# Patient Record
Sex: FEMALE | Race: WHITE | Employment: FULL TIME | URBAN - NONMETROPOLITAN AREA
[De-identification: names, ages, dates, MRNs, and addresses within clinical notes are randomized per-mention and may not be internally consistent; named-entity substitution may affect disease eponyms.]

---

## 2017-09-08 ENCOUNTER — HOSPITAL ENCOUNTER (OUTPATIENT)
Dept: PHYSICAL THERAPY | Age: 57
Setting detail: THERAPIES SERIES
Discharge: HOME | End: 2017-09-08
Payer: COMMERCIAL

## 2017-09-08 PROCEDURE — 97162 PT EVAL MOD COMPLEX 30 MIN: CPT

## 2017-09-10 ASSESSMENT — PAIN DESCRIPTION - LOCATION: LOCATION: LEG

## 2017-09-10 ASSESSMENT — PAIN DESCRIPTION - PAIN TYPE: TYPE: CHRONIC PAIN

## 2017-09-10 ASSESSMENT — PAIN DESCRIPTION - DESCRIPTORS: DESCRIPTORS: CONSTANT;TENDER

## 2017-09-10 ASSESSMENT — PAIN DESCRIPTION - ORIENTATION: ORIENTATION: LEFT;RIGHT

## 2017-09-10 ASSESSMENT — PAIN SCALES - GENERAL: PAINLEVEL_OUTOF10: 6

## 2017-09-18 ENCOUNTER — HOSPITAL ENCOUNTER (OUTPATIENT)
Dept: PHYSICAL THERAPY | Age: 57
Setting detail: THERAPIES SERIES
Discharge: HOME | End: 2017-09-18
Payer: COMMERCIAL

## 2017-09-18 PROCEDURE — 97140 MANUAL THERAPY 1/> REGIONS: CPT

## 2017-09-18 ASSESSMENT — PAIN SCALES - GENERAL: PAINLEVEL_OUTOF10: 7

## 2017-09-18 ASSESSMENT — PAIN DESCRIPTION - PAIN TYPE: TYPE: CHRONIC PAIN

## 2017-09-18 ASSESSMENT — PAIN DESCRIPTION - LOCATION: LOCATION: LEG

## 2017-09-18 ASSESSMENT — PAIN DESCRIPTION - DESCRIPTORS: DESCRIPTORS: TENDER;THROBBING;ACHING

## 2017-09-18 ASSESSMENT — PAIN DESCRIPTION - ORIENTATION: ORIENTATION: RIGHT

## 2017-09-20 ENCOUNTER — HOSPITAL ENCOUNTER (OUTPATIENT)
Dept: PHYSICAL THERAPY | Age: 57
Setting detail: THERAPIES SERIES
Discharge: HOME | End: 2017-09-20
Payer: COMMERCIAL

## 2017-09-20 PROCEDURE — 97140 MANUAL THERAPY 1/> REGIONS: CPT

## 2017-09-20 ASSESSMENT — PAIN DESCRIPTION - DESCRIPTORS: DESCRIPTORS: ACHING;SORE;TENDER

## 2017-09-20 ASSESSMENT — PAIN DESCRIPTION - PAIN TYPE: TYPE: CHRONIC PAIN

## 2017-09-20 ASSESSMENT — PAIN DESCRIPTION - LOCATION: LOCATION: LEG

## 2017-09-20 ASSESSMENT — PAIN SCALES - GENERAL: PAINLEVEL_OUTOF10: 3

## 2017-09-20 ASSESSMENT — PAIN DESCRIPTION - ORIENTATION: ORIENTATION: RIGHT

## 2017-09-22 ENCOUNTER — HOSPITAL ENCOUNTER (OUTPATIENT)
Dept: PHYSICAL THERAPY | Age: 57
Setting detail: THERAPIES SERIES
Discharge: HOME | End: 2017-09-22
Payer: COMMERCIAL

## 2017-09-22 PROCEDURE — 97140 MANUAL THERAPY 1/> REGIONS: CPT

## 2017-09-22 ASSESSMENT — PAIN DESCRIPTION - PAIN TYPE: TYPE: CHRONIC PAIN

## 2017-09-22 ASSESSMENT — PAIN DESCRIPTION - ORIENTATION: ORIENTATION: RIGHT

## 2017-09-22 ASSESSMENT — PAIN DESCRIPTION - LOCATION: LOCATION: LEG;KNEE

## 2017-09-22 ASSESSMENT — PAIN DESCRIPTION - DESCRIPTORS: DESCRIPTORS: ACHING;TENDER;SHOOTING

## 2017-09-22 ASSESSMENT — PAIN SCALES - GENERAL: PAINLEVEL_OUTOF10: 6

## 2017-09-25 ENCOUNTER — HOSPITAL ENCOUNTER (OUTPATIENT)
Dept: PHYSICAL THERAPY | Age: 57
Setting detail: THERAPIES SERIES
Discharge: HOME | End: 2017-09-25
Payer: COMMERCIAL

## 2017-09-25 PROCEDURE — 97140 MANUAL THERAPY 1/> REGIONS: CPT

## 2017-09-25 ASSESSMENT — PAIN DESCRIPTION - PAIN TYPE: TYPE: CHRONIC PAIN

## 2017-09-25 ASSESSMENT — PAIN SCALES - GENERAL: PAINLEVEL_OUTOF10: 3

## 2017-09-25 ASSESSMENT — PAIN DESCRIPTION - LOCATION: LOCATION: LEG;KNEE

## 2017-09-25 ASSESSMENT — PAIN DESCRIPTION - DESCRIPTORS: DESCRIPTORS: ACHING;TENDER

## 2017-09-25 ASSESSMENT — PAIN DESCRIPTION - ORIENTATION: ORIENTATION: RIGHT;LEFT

## 2017-09-27 ENCOUNTER — HOSPITAL ENCOUNTER (OUTPATIENT)
Dept: PHYSICAL THERAPY | Age: 57
Setting detail: THERAPIES SERIES
Discharge: HOME | End: 2017-09-27
Payer: COMMERCIAL

## 2017-09-27 PROCEDURE — 97140 MANUAL THERAPY 1/> REGIONS: CPT

## 2017-09-27 ASSESSMENT — PAIN DESCRIPTION - PAIN TYPE: TYPE: CHRONIC PAIN

## 2017-09-27 ASSESSMENT — PAIN DESCRIPTION - DESCRIPTORS: DESCRIPTORS: ACHING;THROBBING

## 2017-09-27 ASSESSMENT — PAIN DESCRIPTION - LOCATION: LOCATION: KNEE;HIP;ANKLE

## 2017-09-27 ASSESSMENT — PAIN SCALES - GENERAL: PAINLEVEL_OUTOF10: 5

## 2017-09-27 ASSESSMENT — PAIN DESCRIPTION - ORIENTATION: ORIENTATION: RIGHT

## 2017-09-29 ENCOUNTER — HOSPITAL ENCOUNTER (OUTPATIENT)
Dept: PHYSICAL THERAPY | Age: 57
Setting detail: THERAPIES SERIES
Discharge: HOME | End: 2017-09-29
Payer: COMMERCIAL

## 2017-09-29 PROCEDURE — 97140 MANUAL THERAPY 1/> REGIONS: CPT

## 2017-09-29 ASSESSMENT — PAIN SCALES - GENERAL: PAINLEVEL_OUTOF10: 4

## 2017-09-29 ASSESSMENT — PAIN DESCRIPTION - ORIENTATION: ORIENTATION: RIGHT

## 2017-09-29 ASSESSMENT — PAIN DESCRIPTION - PAIN TYPE: TYPE: CHRONIC PAIN

## 2017-09-29 ASSESSMENT — PAIN DESCRIPTION - DESCRIPTORS: DESCRIPTORS: ACHING;THROBBING

## 2017-09-29 ASSESSMENT — PAIN DESCRIPTION - LOCATION: LOCATION: ANKLE;KNEE

## 2017-10-02 ENCOUNTER — HOSPITAL ENCOUNTER (OUTPATIENT)
Dept: PHYSICAL THERAPY | Age: 57
Setting detail: THERAPIES SERIES
Discharge: HOME | End: 2017-10-02
Payer: COMMERCIAL

## 2017-10-02 PROCEDURE — 97140 MANUAL THERAPY 1/> REGIONS: CPT

## 2017-10-02 ASSESSMENT — PAIN SCALES - GENERAL: PAINLEVEL_OUTOF10: 2

## 2017-10-02 ASSESSMENT — PAIN DESCRIPTION - ORIENTATION: ORIENTATION: RIGHT

## 2017-10-02 ASSESSMENT — PAIN DESCRIPTION - PAIN TYPE: TYPE: CHRONIC PAIN

## 2017-10-02 ASSESSMENT — PAIN DESCRIPTION - DESCRIPTORS: DESCRIPTORS: ACHING

## 2017-10-02 NOTE — PROGRESS NOTES
Freda [facility] 61  LYMPHEDEMA SERVICES  DAILY NOTE    Date: 10/2/2017   Patient Name: Haritha Valle        CSN: [id number]   : 1960  (55 y.o.)  Gender: female   Referring Practitioner: DR. Derek Blank. Emily Trent DPM  Diagnosis: EDEMA/LYMPHEDEMA BILATERAL LEGS  Referral Date : 17    PT Visit Information  PT Insurance Information: CH[employer]L CERRATO/BS  Total # of Visits Approved: 20 (HARD LIMIT)  Total # of Visits to Date: 8  Plan of Care/Certification Expiration Date: 17  No Show: 0  Canceled Appointment: 0  Progress Note Counter: 8/10    Restrictions/Precautions  Fall risk, Universal precautions,History of right TKR (). Pain  Patient Currently in Pain: Yes (The patient reported that she accidently caught her foot on something at work and twisted her right lower extremity, especially the right knee region.)  Pain Assessment  Pain Assessment: 0-10  Pain Level: 2  Pain Type: Chronic pain  Pain Location:  (Ankle to knee )  Pain Orientation: Right  Pain Descriptors: Aching (\"It's nothing like it was though\" per patient.)    SUBJECTIVE     -10/02/2017: The patient presented to the Lymphedema clinic on this with Tubigrip stockinette (double layer) on the right lower extremity. The patient stated that she didn't do a lot of walking yesterday because she had a migraine. \"I can tell the difference if I don't walk enough, but I just couldn't do anything but lay in the dark room\" per patient.   -2017: The patient presented to the Lymphedema clinic on this date with no new complaints. \"My legs feel a little bit better today but then I didn't have to stand on the floor without a mat yesterday. \" per patient.   -2017: The patient presented to the Lymphedema clinic with her old compression stockings on (bilateral knee high). She reported that she started having increased discomfort in the plantar surface of the right heel and it made it difficult for her to ambulate due to the pain. -09/25/2017: The patient presented to the Lymphedema clinic on this date with compression bandages off of the right lower extremity due to getting the bandages wet while showering on Saturday morning. She reported that she wrapped the leg with bandages up and thought she had the wrap on tight but notice standing water in the bag at the end of her shower. Therefore the bandages were removed, which was proper compression bandaging precautions to decrease risk of skin breakdown. The patient stated, \"I was afraid not to have anything on my legs, so I pulled out my old compression stockings and I was amazed that I could get them on. \" per patient. The patient wore the old knee high compression stockings in today, she denied pain at with the compression stockings on. OBJECTIVE  -MEASUREMENTS  -LOCATION (A): Metatarsal heads to knee joint line (Right)  -416.2 cm (Increased by 1.9 cm in comparison to last measurements taken on 9/25/2017). -LOCATION (B): Knee joint line to proximal thigh (Right).  -318.2 cm (Decreased by 5.3 cm in comparison to last measurements taken on 9/25/2017).     TREATMENT SESSION  Manual Lymph Drainage  Unilateral Lower Extremity Manual Lymph Drainage (MLD):   Right  Trunk:  Effleurage, Profundus, Terminus, Inguinal Lnn, Axillary Lnn and (IA) Anterior Inguinal to Axillary (3 steps)       Lower Extremity:  Thigh (Anterior, Posterior, Lateral, Medial to Lateral), Knee (Anterior, Posterior, Lateral, Medial, Lower Leg (Anterior,Posterior), Ankle (Anterior, Posterior, Lateral, Medial and Foot/Toes (Anterior, Posterior)       Rework  Lower Extremity (Toes to Groin), Inguinal Lnn, Axillary Lnn, (IA) Anterior Inguinal to Axillary, Terminus, Profundus and Effleurage     Manual Lymph Drainage  Unilateral Lower Extremity Manual Lymph Drainage (MLD):   Right  Trunk:  Effleurage, Profundus, Terminus, Inguinal Lnn, Axillary Lnn and (IA) Anterior Inguinal to Axillary (3 steps)       Lower Extremity:  Thigh

## 2017-10-06 ENCOUNTER — HOSPITAL ENCOUNTER (OUTPATIENT)
Dept: PHYSICAL THERAPY | Age: 57
Setting detail: THERAPIES SERIES
End: 2017-10-06
Payer: COMMERCIAL

## 2017-10-09 ENCOUNTER — HOSPITAL ENCOUNTER (OUTPATIENT)
Dept: PHYSICAL THERAPY | Age: 57
Setting detail: THERAPIES SERIES
Discharge: HOME | End: 2017-10-09
Payer: COMMERCIAL

## 2017-10-09 PROCEDURE — 97140 MANUAL THERAPY 1/> REGIONS: CPT

## 2017-10-09 ASSESSMENT — PAIN DESCRIPTION - ORIENTATION: ORIENTATION: RIGHT;LEFT

## 2017-10-09 ASSESSMENT — PAIN DESCRIPTION - PAIN TYPE: TYPE: CHRONIC PAIN

## 2017-10-09 ASSESSMENT — PAIN SCALES - GENERAL: PAINLEVEL_OUTOF10: 3

## 2017-10-09 ASSESSMENT — PAIN DESCRIPTION - DESCRIPTORS: DESCRIPTORS: ACHING;TIGHTNESS

## 2017-10-09 ASSESSMENT — PAIN DESCRIPTION - LOCATION: LOCATION: LEG

## 2017-10-09 NOTE — PROGRESS NOTES
without a mat yesterday. \" per patient. OBJECTIVE  -Hyperplasia-Light moderate noted in the right lower extremity with mild in the left lower extremity.    -Temperature: Normal with the exception of increased mild warmth noted in the right knee region.  -Skin: Mild dry skin noted in the right lower extremity. TREATMENT SESSION  Manual Lymph Drainage  Bilateral Lower Extremity Manual Lymph Drainage (MLD):     Trunk:   Effleurage, Profundus, Terminus, Axillary Lnn and (IA) Anterior Inguinal to Axillary (3 steps)       Lower Extremity:  Thigh (Anterior, Posterior, Lateral, Medial to Lateral), Knee (Anterior, Posterior, Lateral, Medial, Lower Leg (Anterior,Posterior), Ankle (Anterior, Posterior, Lateral, Medial and Foot/Toes (Anterior, Posterior)       Rework  Lower Extremity (Toes to Groin), Axillary Lnn, (IA) Anterior Inguinal to Axillary, Terminus, Profundus and Effleurage    Fibrotic Technique  -PRESSURE: Moderate  -LOCATION: Posterior aspect of calf (right LE) and medial aspect of the left ankle region. Skin Care Measures  Washed involved extremity/body part and applied Eucerin moisturizing creme to moisturize skin    Compression Bandaging   Location: Right lower extremity (Metatarsal heads to knee joint line). Compression Gradient: Moderate to Minimal  Supplies (per involved extremity):   Stockinette: Size: 4 inches, Thickness: Thick, soft. Transelast none   10 cm Artiflex Cotton none   15 cm Artiflex Cotton none   4 cm Rosidal K none   6 cm Rosidal K 1 roll   8 cm Rosidal K 1 roll   10 cm Rosidal K 1 roll   12 cm Rosidal K none   Rosidal Soft 1 roll    Tubigrip   -Size E from metatarsal heads to above ankle (right). NOTE: Donned the old compression knee high stocking on the patient's left lower extremity. Decongestive/Therapeutic Exercise  The patient was able to complete Decongestive Therapy exercises (x 10 reps) including ankle pumps.  The exercises were completed with compression bandages on, precautionary measures to decrease dry skin and risk of infection. X Patient/family/caregiver will demonstrate applying compression bandages with no greater than moderate assist and verbal cues from the therapist working towards being modified independent with applying the compression bandages for night time swelling. ASSESSMENT:  Assessment:  Patient progressing toward goal achievement. The patient would benefit from sequential compression pump for home use at discharge with lower trunk attachment due to increased proximal swelling leading into the lower abdominal region. The patient is making good progress with the Complete Decongestive Therapy/Manual Lymph Drainage (CDT/MLD) treatments. However, in spite of the progress being made in clinic, the patient continues to demonstrate increased swelling. Activity Tolerance:  Patient tolerance of  treatment: Fair(+).   Plan: Week of October 9 (2x)         Time In: 1200   Time Out: 1310   Timed Code Minutes: 70   Untimed Code Minutes: 0   Total Treatment Time: [address] P[address]TJeaneth #[id number], KATHRYN   10/9/2017

## 2017-10-13 ENCOUNTER — HOSPITAL ENCOUNTER (OUTPATIENT)
Dept: PHYSICAL THERAPY | Age: 57
Setting detail: THERAPIES SERIES
Discharge: HOME | End: 2017-10-13
Payer: COMMERCIAL

## 2017-10-13 PROCEDURE — 97140 MANUAL THERAPY 1/> REGIONS: CPT

## 2017-10-13 ASSESSMENT — PAIN DESCRIPTION - PAIN TYPE: TYPE: CHRONIC PAIN

## 2017-10-13 ASSESSMENT — PAIN DESCRIPTION - DESCRIPTORS: DESCRIPTORS: ACHING;THROBBING;SORE

## 2017-10-13 ASSESSMENT — PAIN SCALES - GENERAL: PAINLEVEL_OUTOF10: 7

## 2017-10-13 ASSESSMENT — PAIN DESCRIPTION - LOCATION: LOCATION: BACK;FOOT;LEG

## 2017-10-13 ASSESSMENT — PAIN DESCRIPTION - ORIENTATION: ORIENTATION: LEFT;RIGHT

## 2017-10-13 NOTE — PROGRESS NOTES
including ankle pumps. The exercises were completed with compression bandages on, without complaints of pain from the patient. The Decongestive Therapy exercises assist with decreasing the swelling by increasing the muscle pumping action of the lymph collectors and by increasing the fluid uptake in the lymphatic system. Patient Education  Education Provided:   -10/13/2017: Reviewed goals and plan of care. -10/09/2017: Reviewed goals and plan of care.  present to observe treatment, unable to complete hands on training due to recently right upper extremity injury. -10/02/2017: The patient's daughter presented to the clinic with the patient to observe today's treatment session.  -09/29/2017: Ongoing education with patient.   -09/27/2017: Discussed plan of care with plan for compression pump demo scheduled for 09/28/2017.  -09/25/2017: Discussed plan of care.   -09/22/2017: Reviewed plan of care.  -09/20/2017: Educated the patient on proper compression bandaging precautions; reviewed exercises including hourly ankle pumps and toe curls/spreading; and discussed safety awareness especially during ambulation.   -09/18/2017: Refer to exercises above. Reviewed goals/plan of care  Learner:patient and significant other  Method: demonstration, explanation and handout       Outcome: acknowledged understanding of goals/plan of care, demonstrated understanding and asked questions     HANDOUTS PROVIDED:  -09/20/2017: Compression bandaging precautions. GOALS   SHORT-TERM GOALS:  to be met in 6 weeks   X  PARTIALLY MET  (ONGOING)  Patient will demonstrate a decrease in circumferential measurements of the affected extremity by 10.0 cm working towards the lymphedema swelling stabilizing and patient being able to get measured and fitted for a new compression garment to be worn daily to keep swelling down.    X  NOT MET  Patient will tolerate wearing the compression bandages from one treatment session until the next

## 2017-10-20 ENCOUNTER — APPOINTMENT (OUTPATIENT)
Dept: PHYSICAL THERAPY | Age: 57
End: 2017-10-20
Payer: COMMERCIAL

## 2017-10-23 ENCOUNTER — APPOINTMENT (OUTPATIENT)
Dept: PHYSICAL THERAPY | Age: 57
End: 2017-10-23
Payer: COMMERCIAL

## 2017-10-27 ENCOUNTER — HOSPITAL ENCOUNTER (OUTPATIENT)
Dept: PHYSICAL THERAPY | Age: 57
Setting detail: THERAPIES SERIES
Discharge: HOME | End: 2017-10-27
Payer: COMMERCIAL

## 2017-10-27 PROCEDURE — 97140 MANUAL THERAPY 1/> REGIONS: CPT

## 2017-10-27 ASSESSMENT — PAIN DESCRIPTION - LOCATION: LOCATION: LEG

## 2017-10-27 ASSESSMENT — PAIN DESCRIPTION - PAIN TYPE: TYPE: CHRONIC PAIN

## 2017-10-27 ASSESSMENT — PAIN DESCRIPTION - DESCRIPTORS: DESCRIPTORS: THROBBING;STABBING;ACHING

## 2017-10-27 ASSESSMENT — PAIN SCALES - GENERAL: PAINLEVEL_OUTOF10: 2

## 2017-10-27 ASSESSMENT — PAIN DESCRIPTION - ORIENTATION: ORIENTATION: LEFT;RIGHT;LOWER

## 2017-10-27 NOTE — PROGRESS NOTES
treatment but he was unable to assist due (patient's  continues to be on restrictions due to Bicep tear). Decongestive/Therapeutic Exercise  The patient was able to complete Decongestive Therapy exercises (x 10 reps) including ankle pumps. The exercises were completed with compression bandages on, without complaints of pain from the patient. The Decongestive Therapy exercises assist with decreasing the swelling by increasing the muscle pumping action of the lymph collectors and by increasing the fluid uptake in the lymphatic system. Patient Education  Education Provided:   -10/27/2017: Refer to \"Compression Garment Assessment and Fitting\" above. Also discussed plan of care with working towards Phase II (training on proper self-MLD). Also provided the patient with new Tubigrip stockinette with instructions to wear the compression stockings daily and Tubigrip stockinette nightly. Patient was in agreement.   -10/13/2017: Reviewed goals and plan of care. -10/09/2017: Reviewed goals and plan of care.  present to observe treatment, unable to complete hands on training due to recently right upper extremity injury. -10/02/2017: The patient's daughter presented to the clinic with the patient to observe today's treatment session.  -09/29/2017: Ongoing education with patient.   -09/27/2017: Discussed plan of care with plan for compression pump demo scheduled for 09/28/2017.  -09/25/2017: Discussed plan of care.   -09/22/2017: Reviewed plan of care.  -09/20/2017: Educated the patient on proper compression bandaging precautions; reviewed exercises including hourly ankle pumps and toe curls/spreading; and discussed safety awareness especially during ambulation.   -09/18/2017: Refer to exercises above.  Reviewed goals/plan of care  Learner:patient and significant other  Method: demonstration, explanation and handout       Outcome: acknowledged understanding of goals/plan of care, demonstrated understanding of garment(s). ASSESSMENT:  Assessment:  Patient progressing toward goal achievement. The patient is making good progress with treatments however, continue to notice that the patient has difficulty keeping the swelling down with in bilateral lower extremities at home and has noticed an increase in swelling proximally in bilateral lower extremities and in lower truncal region. She would benefit from a Flexitouch sequential compression pump to become modified independent with home management skills prior to being able to be discharged from program.   Activity Tolerance:  Patient tolerance of  treatment: Fair (+). Plan: Week of November 6 (1x)-Working on Phase II skills.           Time In: 3430   Time Out: 0905   Timed Code Minutes: 70   Untimed Code Minutes: 0   Total Treatment Time: [address] PJeanethTJeaneth #[id number], Michelle Nguyễn [id number]   10/27/2017

## 2017-11-10 ENCOUNTER — HOSPITAL ENCOUNTER (OUTPATIENT)
Dept: PHYSICAL THERAPY | Age: 57
Setting detail: THERAPIES SERIES
End: 2017-11-10
Payer: COMMERCIAL

## 2017-11-15 ENCOUNTER — HOSPITAL ENCOUNTER (OUTPATIENT)
Dept: PHYSICAL THERAPY | Age: 57
Setting detail: THERAPIES SERIES
Discharge: HOME | End: 2017-11-15
Payer: COMMERCIAL

## 2017-11-15 PROCEDURE — 97140 MANUAL THERAPY 1/> REGIONS: CPT

## 2017-11-15 ASSESSMENT — PAIN DESCRIPTION - LOCATION: LOCATION: ANKLE

## 2017-11-15 ASSESSMENT — PAIN DESCRIPTION - DESCRIPTORS: DESCRIPTORS: ACHING

## 2017-11-15 ASSESSMENT — PAIN DESCRIPTION - ORIENTATION: ORIENTATION: LEFT;RIGHT

## 2017-11-15 ASSESSMENT — PAIN DESCRIPTION - PAIN TYPE: TYPE: CHRONIC PAIN

## 2017-11-15 ASSESSMENT — PAIN SCALES - GENERAL: PAINLEVEL_OUTOF10: 2

## 2017-11-15 NOTE — PROGRESS NOTES
more full in my thighs and my lower stomach\" per patient. \"I am so glad I started this program, because my legs aren't as sore and tender as they use to be, I couldn't even let the sheet touch my legs at night. But now I'm even getting better sleep because my legs don't hurt like that anymore\" per patient.   -10/13/2017: The patient is a pleasant and cooperative 55year old female who has undergone Complete Decongestive Therapy/Manual Lymph Drainage (CDT/MLD) treatment sessions. The patient presented to the Lymphedema clinic with reports of increased lower back and bilateral lower extremity pain with increased left foot > right foot. \"I tried to get into the foot doctor this week, but they didn't have any openings. I could hardly walk yesterday because of the pain\" per patient. The patient ambulated into the clinic today with support of a straight cane. TREATMENT SESSION  Manual Lymph Drainage  Bilateral Lower Extremity Manual Lymph Drainage (MLD):     Trunk:   Effleurage, Profundus, Terminus, Axillary Lnn and (IA) Anterior Inguinal to Axillary (3 steps)       Lower Extremity:  Thigh (Anterior, Posterior, Lateral, Medial to Lateral), Knee (Anterior, Posterior, Lateral, Medial, Lower Leg (Anterior,Posterior), Ankle (Anterior, Posterior, Lateral, Medial and Foot/Toes (Anterior, Posterior)       Rework  Lower Extremity (Toes to Groin), Axillary Lnn, (IA) Anterior Inguinal to Axillary, Terminus, Profundus and Effleurage    Fibrotic Technique  -PRESSURE: Moderate  -LOCATION: Posterior aspect of calf (bilateral LE). Skin Care Measures  Washed involved extremity/body part       Decongestive/Therapeutic Exercise  The patient was able to complete Decongestive Therapy exercises (x 10 reps) including ankle pumps. The exercises were completed with compression bandages on, without complaints of pain from the patient.  The Decongestive Therapy exercises assist with decreasing the swelling by increasing the muscle pumping action of the lymph collectors and by increasing the fluid uptake in the lymphatic system. Patient Education  Education Provided:   -11/15/2017: Reviewed patient's home management program and strongly encouraged the patient to apply moisturizer on a daily basis. -10/27/2017: Refer to \"Compression Garment Assessment and Fitting\" above. Also discussed plan of care with working towards Phase II (training on proper self-MLD). Also provided the patient with new Tubigrip stockinette with instructions to wear the compression stockings daily and Tubigrip stockinette nightly. Patient was in agreement.   -10/13/2017: Reviewed goals and plan of care. -10/09/2017: Reviewed goals and plan of care.  present to observe treatment, unable to complete hands on training due to recently right upper extremity injury. -10/02/2017: The patient's daughter presented to the clinic with the patient to observe today's treatment session.  -09/29/2017: Ongoing education with patient.   -09/27/2017: Discussed plan of care with plan for compression pump demo scheduled for 09/28/2017.  -09/25/2017: Discussed plan of care.   -09/22/2017: Reviewed plan of care.  -09/20/2017: Educated the patient on proper compression bandaging precautions; reviewed exercises including hourly ankle pumps and toe curls/spreading; and discussed safety awareness especially during ambulation.   -09/18/2017: Refer to exercises above. Reviewed goals/plan of care  Learner:patient and significant other  Method: demonstration, explanation and handout       Outcome: acknowledged understanding of goals/plan of care, demonstrated understanding and asked questions     HANDOUTS PROVIDED:  -09/20/2017: Compression bandaging precautions.     GOALS   SHORT-TERM GOALS:  to be met in 6 weeks   X  PARTIALLY MET  (ONGOING)  Patient will demonstrate a decrease in circumferential measurements of the affected extremity by 10.0 cm working towards the lymphedema swelling Minutes: 0   Total Treatment Time: [address], P.T. #[id number], Michelle Nguyễn [id number]   11/15/2017

## 2017-12-01 ENCOUNTER — HOSPITAL ENCOUNTER (OUTPATIENT)
Dept: PHYSICAL THERAPY | Age: 57
Setting detail: THERAPIES SERIES
Discharge: HOME | End: 2017-12-01
Payer: COMMERCIAL

## 2017-12-01 ENCOUNTER — HOSPITAL ENCOUNTER (OUTPATIENT)
Dept: WOMENS IMAGING | Age: 57
Discharge: HOME | End: 2017-12-01
Payer: COMMERCIAL

## 2017-12-01 DIAGNOSIS — Z12.31 VISIT FOR SCREENING MAMMOGRAM: ICD-10-CM

## 2017-12-01 PROCEDURE — 97140 MANUAL THERAPY 1/> REGIONS: CPT

## 2017-12-01 PROCEDURE — 77063 BREAST TOMOSYNTHESIS BI: CPT

## 2017-12-01 ASSESSMENT — PAIN DESCRIPTION - DESCRIPTORS: DESCRIPTORS: ACHING

## 2017-12-01 ASSESSMENT — PAIN DESCRIPTION - ORIENTATION: ORIENTATION: RIGHT;LEFT

## 2017-12-01 ASSESSMENT — PAIN DESCRIPTION - LOCATION: LOCATION: ANKLE;LEG

## 2017-12-01 ASSESSMENT — PAIN SCALES - GENERAL: PAINLEVEL_OUTOF10: 2

## 2017-12-01 ASSESSMENT — PAIN DESCRIPTION - PAIN TYPE: TYPE: CHRONIC PAIN

## 2018-12-31 ENCOUNTER — HOSPITAL ENCOUNTER (OUTPATIENT)
Dept: WOMENS IMAGING | Age: 58
Discharge: HOME | End: 2018-12-31
Payer: COMMERCIAL

## 2018-12-31 DIAGNOSIS — Z12.31 VISIT FOR SCREENING MAMMOGRAM: ICD-10-CM

## 2018-12-31 PROCEDURE — 77067 SCR MAMMO BI INCL CAD: CPT

## 2019-03-13 ENCOUNTER — HOSPITAL ENCOUNTER (OUTPATIENT)
Dept: PHYSICAL THERAPY | Age: 59
Setting detail: THERAPIES SERIES
Discharge: HOME | End: 2019-03-13
Payer: COMMERCIAL

## 2019-03-13 PROCEDURE — 97110 THERAPEUTIC EXERCISES: CPT

## 2019-03-13 PROCEDURE — 97162 PT EVAL MOD COMPLEX 30 MIN: CPT

## 2019-03-13 ASSESSMENT — PAIN DESCRIPTION - LOCATION: LOCATION: BACK;LEG

## 2019-03-13 ASSESSMENT — PAIN DESCRIPTION - ORIENTATION: ORIENTATION: LEFT;RIGHT

## 2019-03-13 ASSESSMENT — PAIN SCALES - GENERAL: PAINLEVEL_OUTOF10: 6

## 2019-03-13 ASSESSMENT — PAIN DESCRIPTION - PAIN TYPE: TYPE: CHRONIC PAIN

## 2019-03-15 ENCOUNTER — HOSPITAL ENCOUNTER (OUTPATIENT)
Dept: PHYSICAL THERAPY | Age: 59
Setting detail: THERAPIES SERIES
Discharge: HOME | End: 2019-03-15
Payer: COMMERCIAL

## 2019-03-15 PROCEDURE — 97113 AQUATIC THERAPY/EXERCISES: CPT

## 2019-03-15 ASSESSMENT — PAIN SCALES - GENERAL: PAINLEVEL_OUTOF10: 4

## 2019-03-15 ASSESSMENT — PAIN DESCRIPTION - LOCATION: LOCATION: BACK

## 2019-03-22 ENCOUNTER — HOSPITAL ENCOUNTER (OUTPATIENT)
Dept: PHYSICAL THERAPY | Age: 59
Setting detail: THERAPIES SERIES
Discharge: HOME | End: 2019-03-22
Payer: COMMERCIAL

## 2019-03-22 PROCEDURE — 97113 AQUATIC THERAPY/EXERCISES: CPT

## 2019-03-22 ASSESSMENT — PAIN SCALES - GENERAL: PAINLEVEL_OUTOF10: 8

## 2019-03-25 ENCOUNTER — APPOINTMENT (OUTPATIENT)
Dept: PHYSICAL THERAPY | Age: 59
End: 2019-03-25
Payer: COMMERCIAL

## 2019-03-27 ENCOUNTER — HOSPITAL ENCOUNTER (OUTPATIENT)
Dept: PHYSICAL THERAPY | Age: 59
Setting detail: THERAPIES SERIES
Discharge: HOME | End: 2019-03-27
Payer: COMMERCIAL

## 2019-03-27 PROCEDURE — 97113 AQUATIC THERAPY/EXERCISES: CPT

## 2019-03-27 ASSESSMENT — PAIN DESCRIPTION - PAIN TYPE: TYPE: CHRONIC PAIN

## 2019-03-27 ASSESSMENT — PAIN DESCRIPTION - LOCATION: LOCATION: BACK

## 2019-03-27 ASSESSMENT — PAIN SCALES - GENERAL: PAINLEVEL_OUTOF10: 3

## 2019-04-01 ENCOUNTER — APPOINTMENT (OUTPATIENT)
Dept: PHYSICAL THERAPY | Age: 59
End: 2019-04-01
Payer: COMMERCIAL

## 2019-04-05 ENCOUNTER — HOSPITAL ENCOUNTER (OUTPATIENT)
Dept: PHYSICAL THERAPY | Age: 59
Setting detail: THERAPIES SERIES
Discharge: HOME | End: 2019-04-05
Payer: COMMERCIAL

## 2019-04-05 PROCEDURE — 97113 AQUATIC THERAPY/EXERCISES: CPT

## 2019-04-05 ASSESSMENT — PAIN DESCRIPTION - LOCATION: LOCATION: BACK

## 2019-04-05 ASSESSMENT — PAIN DESCRIPTION - ORIENTATION: ORIENTATION: RIGHT;LEFT

## 2019-04-05 ASSESSMENT — PAIN SCALES - GENERAL: PAINLEVEL_OUTOF10: 1

## 2019-04-05 NOTE — PROGRESS NOTES
Freda Wade 61  OUTPATIENT PHYSICAL THERAPY  AQUATICS NOTE  Yesica Solomon     Time In: 1430  Time Out: 4816  Minutes: 40  Timed Code Treatment Minutes: 40 Minutes             Date: 2019  Patient Name: Brenda Pate,  Gender:  female        CSN: [id number]   : 1960  (63 y.o.)  Referral Date : 19    Referring Practitioner: Ran Other       Diagnosis: T24.390 (ICD-10-CM) - Spinal stenosis, lumbar region with neurogenic claudication  Treatment Diagnosis: Lumbago, spinal stiffness, muscular weakness, BLE radicular pain. Additional Pertinent Hx: hx of LLE DVT, breathing problems SOB, CHF. R TKA in 2016. General:  PT Visit Information  Onset Date: 19  PT Insurance Information: [employer] - 25% copay - 20 visit hard limit, aquatic and modalities covered, no massage. Total # of Visits Approved: 20  Total # of Visits to Date: 5  Plan of Care/Certification Expiration Date: 19               Subjective:  Chart Reviewed: Yes  Other (Comment): Physician script for aquatic therapy     Subjective: Reports pain has improved in LB     Pain:  Patient Currently in Pain: Yes  Pain Assessment: 0-10  Pain Level: 1  Pain Location: Back  Pain Orientation: Right, Left      Objective         Aquatic Therapy: UE, LE        LE Warm Up: Ambulation (F,L,R): 2 laps        Static Strengthening UEs  Shoulder Flex: X15 with wide JONES and stabs  Shoulder ABD/ADD: X15 with wide JONES and stabs  Shoulder Horiz ABD/ADD: X15 with wide JONES and stabs        Static Strengthening LEs  with UE support on wall     Heel/Toe Raises: X15  Squats: X15  Marching: X15  Hamstring Curls: X15  4-Way Hip: 3 way hip X15     Deep H2O LE  Hang: in 5ft x 10 min                                                                                                                                  Activity Tolerance:  Activity Tolerance: Patient Tolerated treatment well    Assessment:   Body structures, Functions, Activity limitations: Decreased ROM, Decreased strength, Decreased sensation, Decreased high-level IADLs, Increased Pain, Decreased endurance, Decreased balance, Decreased ADL status  Prognosis: Good  Discharge Recommendations: Continue to assess pending progress    Patient Education:  Patient Education: Aquatic ther ex                      Plan:  Times per week: 2  Plan weeks: 8  Specific instructions for Next Treatment: Aquatic therapy for lower back - patient has slight fear of water. Deep water unloading holding side of wall, ambulation, UE and LE strengthening with abdominal bracing. Hamstring stretching on step. Current Treatment Recommendations: Strengthening, ROM, Balance Training, Gait Training, Stair training, Manual Therapy - Joint Manipulation, Manual Therapy - Soft Tissue Mobilization, Home Exercise Program, Modalities, Aquatics  Plan Comment: Pt requests to be placed on hold for the next two weeks to follow up with her doctor on a seperate medical issue. Goals:  Patient goals : Decrease pain, walk better for walking dogs and at work    Short term goals  Time Frame for Short term goals: 4 weeks  Short term goal 1: Patient will report decreased lower back pain and BLE radicular nerve pain from baseline 6/10 to less than 2/10 in order to sleep more comfortably. Short term goal 2: Patient will increase BLE flexibility to 0-90 deg hamstring stretch in order to bend forward without straining back for donning socks and shoes. Short term goal 3: Patient will demonstrate good abdominal bracing with LE and UE pool exercises x20 reps in order to progress to land based therapy. Short term goal 4: Patient will be IND with HEP in order to meet long term goals. Long term goals  Time Frame for Long term goals : 8 weeks  Long term goal 1: Patient will squat to reach item off lfoor in order to protect lumbar spine during household tasks and work.   Long term goal 2: Patient will ascend/descend 4 steps with one handrail reciprocal pattern in order to enter/exit home without difficulty. Long term goal 3: Patient will report no increased back pain at end of work shift in order to tolerate longer durations of standing.      Jolanta Wallace  [id number]

## 2019-04-08 ENCOUNTER — APPOINTMENT (OUTPATIENT)
Dept: PHYSICAL THERAPY | Age: 59
End: 2019-04-08
Payer: COMMERCIAL

## 2019-04-12 ENCOUNTER — APPOINTMENT (OUTPATIENT)
Dept: PHYSICAL THERAPY | Age: 59
End: 2019-04-12
Payer: COMMERCIAL

## 2019-04-15 ENCOUNTER — APPOINTMENT (OUTPATIENT)
Dept: PHYSICAL THERAPY | Age: 59
End: 2019-04-15
Payer: COMMERCIAL

## 2019-04-19 ENCOUNTER — APPOINTMENT (OUTPATIENT)
Dept: PHYSICAL THERAPY | Age: 59
End: 2019-04-19
Payer: COMMERCIAL

## 2019-04-22 ENCOUNTER — APPOINTMENT (OUTPATIENT)
Dept: PHYSICAL THERAPY | Age: 59
End: 2019-04-22
Payer: COMMERCIAL

## 2019-04-26 ENCOUNTER — APPOINTMENT (OUTPATIENT)
Dept: PHYSICAL THERAPY | Age: 59
End: 2019-04-26
Payer: COMMERCIAL

## 2019-04-29 ENCOUNTER — APPOINTMENT (OUTPATIENT)
Dept: PHYSICAL THERAPY | Age: 59
End: 2019-04-29
Payer: COMMERCIAL

## 2019-04-30 NOTE — DISCHARGE SUMMARY
[facility]  PHYSICAL THERAPY QUICK DISCHARGE NOTE  OUTPATIENT  [address] [facility]    Patient Name: Tanisha Reese        CSN: [id number]   YOB: 1960  Gender: female  Referring Practitioner: Yrn Paris   Diagnosis: L32.231 (ICD-10-CM) - Spinal stenosis, lumbar region with neurogenic claudication    Patient is discharged from Physical Therapy services at this time. See last note for details related to results of therapy and goal achievement. Reason for discharge:  Patient was last seen in clinic on 4/5/19, asked to cancel remaining appointments. She had good results from injection, pain was 1/10 at this appointment. Patient never called back to reschedule these. At this point we would need new PT script prior to any future treatment.        Ruben Cali, PT, DPT

## 2019-05-03 ENCOUNTER — HOSPITAL ENCOUNTER (OUTPATIENT)
Dept: PHYSICAL THERAPY | Age: 59
Setting detail: THERAPIES SERIES
Discharge: HOME | End: 2019-05-03
Payer: COMMERCIAL

## 2021-01-15 ENCOUNTER — HOSPITAL ENCOUNTER (OUTPATIENT)
Dept: WOMENS IMAGING | Age: 61
Discharge: HOME | End: 2021-01-15
Payer: COMMERCIAL

## 2021-01-15 DIAGNOSIS — Z12.31 VISIT FOR SCREENING MAMMOGRAM: ICD-10-CM

## 2021-01-15 PROCEDURE — 77063 BREAST TOMOSYNTHESIS BI: CPT

## 2022-11-07 ENCOUNTER — TRANSCRIBE ORDERS (OUTPATIENT)
Dept: ADMINISTRATIVE | Age: 62
End: 2022-11-07

## 2022-11-07 DIAGNOSIS — Z12.31 BREAST CANCER SCREENING BY MAMMOGRAM: Primary | ICD-10-CM

## 2022-12-02 ENCOUNTER — HOSPITAL ENCOUNTER (OUTPATIENT)
Dept: WOMENS IMAGING | Age: 62
Discharge: HOME | End: 2022-12-02
Payer: COMMERCIAL

## 2022-12-02 DIAGNOSIS — Z12.31 BREAST CANCER SCREENING BY MAMMOGRAM: ICD-10-CM

## 2022-12-02 PROCEDURE — 77067 SCR MAMMO BI INCL CAD: CPT

## 2023-04-24 ENCOUNTER — NURSE ONLY (OUTPATIENT)
Dept: LAB | Age: 63
End: 2023-04-24

## 2024-05-06 ENCOUNTER — HOSPITAL ENCOUNTER (OUTPATIENT)
Dept: WOMENS IMAGING | Age: 64
Discharge: HOME | End: 2024-05-06
Payer: COMMERCIAL

## 2024-05-06 VITALS — BODY MASS INDEX: 47.09 KG/M2 | HEIGHT: 66 IN | WEIGHT: 293 LBS

## 2024-05-06 DIAGNOSIS — Z12.31 VISIT FOR SCREENING MAMMOGRAM: ICD-10-CM

## 2024-05-06 PROCEDURE — 77063 BREAST TOMOSYNTHESIS BI: CPT

## 2025-05-05 ENCOUNTER — LAB (OUTPATIENT)
Dept: LAB | Age: 65
End: 2025-05-05

## 2025-05-08 ENCOUNTER — TRANSCRIBE ORDERS (OUTPATIENT)
Dept: ADMINISTRATIVE | Age: 65
End: 2025-05-08

## 2025-05-08 DIAGNOSIS — Z12.31 ENCOUNTER FOR SCREENING MAMMOGRAM FOR MALIGNANT NEOPLASM OF BREAST: Primary | ICD-10-CM

## 2025-05-14 LAB — CYTOLOGY THIN PREP PAP: NORMAL

## 2025-08-15 ENCOUNTER — HOSPITAL ENCOUNTER (OUTPATIENT)
Dept: WOMENS IMAGING | Age: 65
Discharge: HOME | End: 2025-08-15
Payer: COMMERCIAL

## 2025-08-15 VITALS — BODY MASS INDEX: 47.09 KG/M2 | HEIGHT: 66 IN | WEIGHT: 293 LBS

## 2025-08-15 DIAGNOSIS — Z12.31 VISIT FOR SCREENING MAMMOGRAM: ICD-10-CM

## 2025-08-15 PROCEDURE — 77063 BREAST TOMOSYNTHESIS BI: CPT
